# Patient Record
Sex: FEMALE | Race: WHITE | NOT HISPANIC OR LATINO | URBAN - METROPOLITAN AREA
[De-identification: names, ages, dates, MRNs, and addresses within clinical notes are randomized per-mention and may not be internally consistent; named-entity substitution may affect disease eponyms.]

---

## 2019-02-21 ENCOUNTER — IMPORTED ENCOUNTER (OUTPATIENT)
Dept: URBAN - METROPOLITAN AREA CLINIC 31 | Facility: CLINIC | Age: 59
End: 2019-02-21

## 2019-02-21 PROBLEM — D31.31: Noted: 2019-02-21

## 2019-02-21 PROBLEM — H04.123: Noted: 2019-02-21

## 2019-02-21 PROBLEM — H25.13: Noted: 2019-02-21

## 2019-02-21 PROCEDURE — 92250 FUNDUS PHOTOGRAPHY W/I&R: CPT

## 2019-02-21 PROCEDURE — 92014 COMPRE OPH EXAM EST PT 1/>: CPT

## 2019-02-21 NOTE — PATIENT DISCUSSION
1.  Dry Eyes OU:  Start artificials tears. Encouraged regular use. If watering gets worse then punctal dilation and irrigation. Has only been happening on computer. 2. 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 Advise continued observation. Discussed in detail with the patient. The patient voiced understanding. 3.  Nuclear Sclerotic Cataract OU: Explained how cataracts can effect vision. Recommend clinical observation. The patient was advised to contact us if any change or worsening of vision. 4. Return for an appointment in 1 year for comprehensive exam. with Dr. Jonathan Colindres.

## 2020-09-28 ENCOUNTER — IMPORTED ENCOUNTER (OUTPATIENT)
Dept: URBAN - METROPOLITAN AREA CLINIC 31 | Facility: CLINIC | Age: 60
End: 2020-09-28

## 2020-09-28 PROBLEM — H04.123: Noted: 2020-09-28

## 2020-09-28 PROBLEM — H25.13: Noted: 2020-09-28

## 2020-09-28 PROBLEM — D31.31: Noted: 2020-09-28

## 2020-09-28 PROCEDURE — 92250 FUNDUS PHOTOGRAPHY W/I&R: CPT

## 2020-09-28 PROCEDURE — 92014 COMPRE OPH EXAM EST PT 1/>: CPT

## 2020-09-28 PROCEDURE — 92015 DETERMINE REFRACTIVE STATE: CPT

## 2020-09-28 NOTE — PATIENT DISCUSSION
1.  Nuclear Sclerotic Cataract OU: Explained how cataracts can effect vision. Recommend clinical observation. The patient was advised to contact us if any change or worsening of vision. 2. Dry Eye OU:  Continue current management with Artificial Tears. 3.  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 Advise continued observation. Discussed in detail with the patient. The patient voiced understanding. 4.  Refractive error - update glasses. 5. Return for an appointment in 1 year for comprehensive exam. and Optos with Dr. Indra Felipe.

## 2021-09-28 ENCOUNTER — IMPORTED ENCOUNTER (OUTPATIENT)
Dept: URBAN - METROPOLITAN AREA CLINIC 31 | Facility: CLINIC | Age: 61
End: 2021-09-28

## 2021-09-28 PROBLEM — H04.123: Noted: 2021-09-28

## 2021-09-28 PROBLEM — H25.13: Noted: 2021-09-28

## 2021-09-28 PROBLEM — D31.31: Noted: 2021-09-28

## 2021-09-28 PROCEDURE — 92014 COMPRE OPH EXAM EST PT 1/>: CPT

## 2021-09-28 PROCEDURE — 92250 FUNDUS PHOTOGRAPHY W/I&R: CPT

## 2021-09-28 PROCEDURE — 92015 DETERMINE REFRACTIVE STATE: CPT

## 2021-09-28 NOTE — PATIENT DISCUSSION
1.  Nuclear Sclerotic Cataract OU: Monitor. 2. Dry Eye OU:  Continue current management with Artificial Tears. 3.  Nevus OD: Stable monitor. 4.  Refractive error - update glasses. Change optional in 1 year for comprehensive exam. and Optos with Dr. Ministerio Russell.

## 2022-01-17 ENCOUNTER — OFFICE VISIT (OUTPATIENT)
Age: 62
End: 2022-01-17

## 2022-03-07 ENCOUNTER — TELEPHONE ENCOUNTER (OUTPATIENT)
Dept: URBAN - METROPOLITAN AREA CLINIC 9 | Facility: CLINIC | Age: 62
End: 2022-03-07

## 2022-04-02 ASSESSMENT — VISUAL ACUITY
OS_SC: 20/25
OS_SC: 20/20
OD_SC: 20/30
OD_CC: 20/50-1
OS_SC: 20/2514''
OD_SC: 20/20
OS_CC: 20/50
OS_SC: 20/25-2
OD_SC: 20/20
OU_SC: 20/25
OD_SC: 20/2514''

## 2022-04-02 ASSESSMENT — TONOMETRY
OD_IOP_MMHG: 12
OS_IOP_MMHG: 10
OD_IOP_MMHG: 10
OD_IOP_MMHG: 10
OS_IOP_MMHG: 10
OS_IOP_MMHG: 13

## 2022-07-30 ENCOUNTER — TELEPHONE ENCOUNTER (OUTPATIENT)
Age: 62
End: 2022-07-30

## 2022-07-31 ENCOUNTER — TELEPHONE ENCOUNTER (OUTPATIENT)
Age: 62
End: 2022-07-31

## 2022-09-26 ENCOUNTER — PREPPED CHART (OUTPATIENT)
Dept: URBAN - METROPOLITAN AREA CLINIC 29 | Facility: CLINIC | Age: 62
End: 2022-09-26

## 2022-09-26 NOTE — PATIENT DISCUSSION
1.  Nuclear Sclerotic Cataract OU: Monitor. 2. Dry Eye OU:  Continue current management with Artificial Tears. 3.  Nevus OD: Stable monitor. 4.  Refractive error - update glasses. Change optional in 1 year for comprehensive exam. and Optos with Dr. Caleb Olmstead.

## 2024-02-08 ENCOUNTER — LAB (OUTPATIENT)
Dept: URBAN - METROPOLITAN AREA SURGERY CENTER 5 | Facility: SURGERY CENTER | Age: 64
End: 2024-02-08

## 2024-02-12 ENCOUNTER — DAC (OUTPATIENT)
Dept: URBAN - METROPOLITAN AREA SURGERY CENTER 5 | Facility: SURGERY CENTER | Age: 64
End: 2024-02-12
Payer: COMMERCIAL

## 2024-02-12 DIAGNOSIS — Z12.11 ENCOUNTER FOR SCREENING FOR MALIGNANT NEOPLASM OF COLON: ICD-10-CM

## 2024-02-12 DIAGNOSIS — K57.30 DIVERTICULOSIS OF LARGE INTESTINE WITHOUT PERFORATION OR ABSCESS WITHOUT BLEEDING: ICD-10-CM

## 2024-02-12 DIAGNOSIS — K64.2 THIRD DEGREE HEMORRHOIDS: ICD-10-CM

## 2024-02-12 PROCEDURE — G0121 COLON CA SCRN NOT HI RSK IND: HCPCS | Performed by: INTERNAL MEDICINE

## 2024-02-14 ENCOUNTER — LAB (OUTPATIENT)
Dept: URBAN - METROPOLITAN AREA CLINIC 7 | Facility: CLINIC | Age: 64
End: 2024-02-14

## 2024-04-02 ENCOUNTER — HEM (OUTPATIENT)
Dept: URBAN - METROPOLITAN AREA CLINIC 7 | Facility: CLINIC | Age: 64
End: 2024-04-02

## 2024-04-16 ENCOUNTER — HEM (OUTPATIENT)
Dept: URBAN - METROPOLITAN AREA CLINIC 7 | Facility: CLINIC | Age: 64
End: 2024-04-16

## 2024-04-30 ENCOUNTER — HEM (OUTPATIENT)
Dept: URBAN - METROPOLITAN AREA CLINIC 7 | Facility: CLINIC | Age: 64
End: 2024-04-30

## 2024-05-13 ENCOUNTER — COMPREHENSIVE EXAM (OUTPATIENT)
Dept: URBAN - METROPOLITAN AREA CLINIC 29 | Facility: CLINIC | Age: 64
End: 2024-05-13

## 2024-05-13 DIAGNOSIS — D31.31: ICD-10-CM

## 2024-05-13 DIAGNOSIS — H52.13: ICD-10-CM

## 2024-05-13 DIAGNOSIS — H52.203: ICD-10-CM

## 2024-05-13 PROCEDURE — 92250 FUNDUS PHOTOGRAPHY W/I&R: CPT

## 2024-05-13 PROCEDURE — 92014 COMPRE OPH EXAM EST PT 1/>: CPT

## 2024-05-13 PROCEDURE — 92015 DETERMINE REFRACTIVE STATE: CPT

## 2024-05-13 ASSESSMENT — VISUAL ACUITY
OD_CC: 20/20
OU_SC: 20/20
OS_CC: 20/20

## 2024-05-13 ASSESSMENT — TONOMETRY
OS_IOP_MMHG: 10
OD_IOP_MMHG: 12

## 2024-05-22 ENCOUNTER — OFFICE VISIT (OUTPATIENT)
Dept: URBAN - METROPOLITAN AREA CLINIC 7 | Facility: CLINIC | Age: 64
End: 2024-05-22

## 2024-06-05 ENCOUNTER — OFFICE VISIT (OUTPATIENT)
Dept: URBAN - METROPOLITAN AREA CLINIC 7 | Facility: CLINIC | Age: 64
End: 2024-06-05

## 2024-06-19 ENCOUNTER — OFFICE VISIT (OUTPATIENT)
Dept: URBAN - METROPOLITAN AREA CLINIC 7 | Facility: CLINIC | Age: 64
End: 2024-06-19

## 2025-06-13 ENCOUNTER — COMPREHENSIVE EXAM (OUTPATIENT)
Age: 65
End: 2025-06-13

## 2025-06-13 DIAGNOSIS — H52.4: ICD-10-CM

## 2025-06-13 DIAGNOSIS — H52.13: ICD-10-CM

## 2025-06-13 DIAGNOSIS — H52.223: ICD-10-CM

## 2025-06-13 PROCEDURE — 92014 COMPRE OPH EXAM EST PT 1/>: CPT

## 2025-06-13 PROCEDURE — 92015 DETERMINE REFRACTIVE STATE: CPT
